# Patient Record
Sex: MALE
[De-identification: names, ages, dates, MRNs, and addresses within clinical notes are randomized per-mention and may not be internally consistent; named-entity substitution may affect disease eponyms.]

---

## 2020-03-05 PROBLEM — Z00.00 ENCOUNTER FOR PREVENTIVE HEALTH EXAMINATION: Status: ACTIVE | Noted: 2020-03-05

## 2020-03-19 ENCOUNTER — RESULT REVIEW (OUTPATIENT)
Age: 35
End: 2020-03-19

## 2020-03-19 ENCOUNTER — APPOINTMENT (OUTPATIENT)
Dept: HEMATOLOGY ONCOLOGY | Facility: CLINIC | Age: 35
End: 2020-03-19
Payer: COMMERCIAL

## 2020-03-19 VITALS
DIASTOLIC BLOOD PRESSURE: 58 MMHG | TEMPERATURE: 98.1 F | HEIGHT: 69.49 IN | SYSTOLIC BLOOD PRESSURE: 103 MMHG | WEIGHT: 207.98 LBS | OXYGEN SATURATION: 98 % | RESPIRATION RATE: 18 BRPM | HEART RATE: 60 BPM | BODY MASS INDEX: 30.11 KG/M2

## 2020-03-19 DIAGNOSIS — Z82.49 FAMILY HISTORY OF ISCHEMIC HEART DISEASE AND OTHER DISEASES OF THE CIRCULATORY SYSTEM: ICD-10-CM

## 2020-03-19 DIAGNOSIS — Z80.42 FAMILY HISTORY OF MALIGNANT NEOPLASM OF PROSTATE: ICD-10-CM

## 2020-03-19 DIAGNOSIS — F41.8 OTHER SPECIFIED ANXIETY DISORDERS: ICD-10-CM

## 2020-03-19 DIAGNOSIS — R26.89 OTHER ABNORMALITIES OF GAIT AND MOBILITY: ICD-10-CM

## 2020-03-19 DIAGNOSIS — I63.9 CEREBRAL INFARCTION, UNSPECIFIED: ICD-10-CM

## 2020-03-19 DIAGNOSIS — Z87.891 PERSONAL HISTORY OF NICOTINE DEPENDENCE: ICD-10-CM

## 2020-03-19 DIAGNOSIS — I21.9 ACUTE MYOCARDIAL INFARCTION, UNSPECIFIED: ICD-10-CM

## 2020-03-19 DIAGNOSIS — D68.51 ACTIVATED PROTEIN C RESISTANCE: ICD-10-CM

## 2020-03-19 DIAGNOSIS — Z84.89 FAMILY HISTORY OF OTHER SPECIFIED CONDITIONS: ICD-10-CM

## 2020-03-19 DIAGNOSIS — Z80.8 FAMILY HISTORY OF MALIGNANT NEOPLASM OF OTHER ORGANS OR SYSTEMS: ICD-10-CM

## 2020-03-19 PROCEDURE — 99205 OFFICE O/P NEW HI 60 MIN: CPT

## 2020-03-19 RX ORDER — ATENOLOL 25 MG/1
25 TABLET ORAL
Refills: 0 | Status: ACTIVE | COMMUNITY

## 2020-03-19 RX ORDER — ROSUVASTATIN CALCIUM 40 MG/1
40 TABLET, FILM COATED ORAL
Refills: 0 | Status: ACTIVE | COMMUNITY

## 2020-03-19 RX ORDER — ESCITALOPRAM OXALATE 10 MG/1
10 TABLET, FILM COATED ORAL
Refills: 0 | Status: COMPLETED | COMMUNITY
End: 2020-03-19

## 2020-03-20 PROBLEM — R26.89 IMBALANCE: Status: ACTIVE | Noted: 2020-03-19

## 2020-03-20 PROBLEM — D68.51 FACTOR V LEIDEN: Status: ACTIVE | Noted: 2020-03-19

## 2020-03-20 PROBLEM — I21.9 MYOCARDIAL INFARCTION, UNSPECIFIED MI TYPE, UNSPECIFIED ARTERY: Status: ACTIVE | Noted: 2020-03-19

## 2020-03-20 NOTE — ASSESSMENT
[FreeTextEntry1] : 34 yo male with Factor V Leiden and history of MI 2015 and CVA despite beig on ASA.  Patient has underlying hypercholesterolemia and is on statins.\par \par There is no family h/o thrombosis or miscarriages.\par \par Given h/o CVA while on ASA in light of Factor V Leiden and significant cardiovascular disease patient is a candidate for prophylactic AC with apixaban 2.5 mg PO BID.\par Reviewed with patient side effects and monitoring parameters. \par \par Imbalance- PT referral \par \par

## 2020-03-20 NOTE — REASON FOR VISIT
[Initial Consultation] : an initial consultation for [Spouse] : spouse [FreeTextEntry2] : Factor V Leiden

## 2020-03-20 NOTE — HISTORY OF PRESENT ILLNESS
[de-identified] : 35 year old male presents today for initial consultation of Factor V Leiden, referred by Dr. Geller.  Patient states he was experiencing dizziness for approximately 1 year- and recently had an MRI which showed a CVA (Jan 2020).  He states he is on baby ASA and did have an MI 5 years ago.  He is also having on and off pain in the LUQ area-described as "Stabbing", localized- no rash.  Sees Dr Cavazos [facility] for cardiology.  \par \par Family history negative for any hematological disorders, pertinent for brother with brain tumor at the age of ten, surgically resected, father with prostate cancer and paternal grandfather with brain tumor.

## 2020-03-20 NOTE — CONSULT LETTER
[Dear  ___] : Dear  [unfilled], [Consult Letter:] : I had the pleasure of evaluating your patient, [unfilled]. [Please see my note below.] : Please see my note below. [Consult Closing:] : Thank you very much for allowing me to participate in the care of this patient.  If you have any questions, please do not hesitate to contact me. [Sincerely,] : Sincerely, [FreeTextEntry3] : Gabby Rice MD\par [facility] [place] [facility]\par

## 2020-06-18 ENCOUNTER — APPOINTMENT (OUTPATIENT)
Dept: HEMATOLOGY ONCOLOGY | Facility: CLINIC | Age: 35
End: 2020-06-18

## 2021-08-16 RX ORDER — APIXABAN 2.5 MG/1
2.5 TABLET, FILM COATED ORAL TWICE DAILY
Qty: 30 | Refills: 0 | Status: ACTIVE | COMMUNITY
Start: 2020-03-19 | End: 1900-01-01

## 2021-08-19 ENCOUNTER — APPOINTMENT (OUTPATIENT)
Dept: HEMATOLOGY ONCOLOGY | Facility: CLINIC | Age: 36
End: 2021-08-19